# Patient Record
Sex: MALE | Race: WHITE | Employment: OTHER | URBAN - METROPOLITAN AREA
[De-identification: names, ages, dates, MRNs, and addresses within clinical notes are randomized per-mention and may not be internally consistent; named-entity substitution may affect disease eponyms.]

---

## 2019-03-27 ENCOUNTER — HOSPITAL ENCOUNTER (OUTPATIENT)
Age: 74
Discharge: HOME | End: 2019-03-29
Payer: MEDICARE

## 2019-03-27 ENCOUNTER — HOSPITAL ENCOUNTER (OUTPATIENT)
Dept: GENERAL RADIOLOGY | Age: 74
Discharge: HOME | End: 2019-03-29
Payer: MEDICARE

## 2019-03-27 DIAGNOSIS — M54.40 ACUTE RIGHT-SIDED LOW BACK PAIN WITH SCIATICA, SCIATICA LATERALITY UNSPECIFIED: ICD-10-CM

## 2019-03-27 PROCEDURE — 72110 X-RAY EXAM L-2 SPINE 4/>VWS: CPT

## 2019-06-12 ENCOUNTER — HOSPITAL ENCOUNTER (OUTPATIENT)
Dept: GENERAL RADIOLOGY | Age: 74
Discharge: HOME | End: 2019-06-14
Payer: MEDICARE

## 2019-06-12 ENCOUNTER — HOSPITAL ENCOUNTER (OUTPATIENT)
Dept: MRI IMAGING | Age: 74
Discharge: HOME | End: 2019-06-14
Payer: MEDICARE

## 2019-06-12 DIAGNOSIS — M54.50 ACUTE MIDLINE LOW BACK PAIN WITHOUT SCIATICA: ICD-10-CM

## 2019-06-12 DIAGNOSIS — S05.41XA RETROBULBAR FOREIGN BODY OF BOTH EYES: ICD-10-CM

## 2019-06-12 DIAGNOSIS — M19.90 OSTEOARTHRITIS, UNSPECIFIED OSTEOARTHRITIS TYPE, UNSPECIFIED SITE: ICD-10-CM

## 2019-06-12 DIAGNOSIS — S05.42XA RETROBULBAR FOREIGN BODY OF BOTH EYES: ICD-10-CM

## 2019-06-12 DIAGNOSIS — M54.9 DORSALGIA: ICD-10-CM

## 2019-06-12 DIAGNOSIS — M25.552 LEFT HIP PAIN: ICD-10-CM

## 2019-06-12 DIAGNOSIS — M54.16 LUMBAR RADICULOPATHY: ICD-10-CM

## 2019-06-12 PROCEDURE — 73502 X-RAY EXAM HIP UNI 2-3 VIEWS: CPT

## 2019-06-12 PROCEDURE — 70030 X-RAY EYE FOR FOREIGN BODY: CPT

## 2019-06-12 PROCEDURE — 72148 MRI LUMBAR SPINE W/O DYE: CPT

## 2019-12-23 ENCOUNTER — HOSPITAL ENCOUNTER (OUTPATIENT)
Age: 74
Discharge: HOME | End: 2019-12-25

## 2019-12-23 PROCEDURE — 88305 TISSUE EXAM BY PATHOLOGIST: CPT

## 2020-09-21 ENCOUNTER — HOSPITAL ENCOUNTER (EMERGENCY)
Age: 75
Discharge: HOME | End: 2020-09-21
Attending: EMERGENCY MEDICINE
Payer: MEDICARE

## 2020-09-21 VITALS
SYSTOLIC BLOOD PRESSURE: 166 MMHG | BODY MASS INDEX: 25.92 KG/M2 | HEART RATE: 74 BPM | HEIGHT: 69 IN | RESPIRATION RATE: 14 BRPM | OXYGEN SATURATION: 97 % | DIASTOLIC BLOOD PRESSURE: 85 MMHG | TEMPERATURE: 98.7 F | WEIGHT: 175 LBS

## 2020-09-21 PROCEDURE — 99284 EMERGENCY DEPT VISIT MOD MDM: CPT

## 2020-09-21 ASSESSMENT — VISUAL ACUITY: OU: 1

## 2020-09-21 NOTE — ED NOTES
Mode of arrival (squad #, walk in, police, etc) : Medic 40        Chief complaint(s): Right arm heaviness        Arrival Note (brief scenario, treatment PTA, etc). : Pt is camping at [facility] after taking a walk this morning he sat in a chair with his right arm behind his head. Pt states he fell asleep for about 40 minutes with his arm behind his head. When he woke up, patient states his right arm felt weird/heavy. PMS intact.                 Bulmaro Paez RN  09/21/20 [id number]

## 2020-09-21 NOTE — ED PROVIDER NOTES
[address] ED  eMERGENCY dEPARTMENT eNCOUnter      Pt Name: Yudelka Fuentes  MRN: [id number]  [place] 1945  Date of evaluation: 9/21/2020  Provider: Tigist Kimble PA-C    CHIEF COMPLAINT       Chief Complaint   Patient presents with    Extremity Weakness     right arm           HISTORY OF PRESENT ILLNESS  (Location/Symptom, Timing/Onset, Context/Setting, Quality, Duration, Modifying Factors, Severity.)   Yudelka Fuentes is a 76 y.o. male who presents to the emergency department with complaints of right arm \"heaviness. \"  Pt reports he is camping at [facility] and fell asleep in his [address] chair around 9am.  Reports his right arm was above his head with his head resting in his hand. States he was asleep for 30-40 minutes. Reports when he woke up his arm was asleep mainly from his elbow to his shoulder and in his palm. The tingling sensation did resolve but patient became concerned when he was having trouble gripping the soap in the shower. Pt states he now has a heavy feeling in his arm and thinks his hand is colder than left hand. Pt also reports when he had to sign for EMS his signature was not as neat. Pt denies any other symptoms. Denies headache, lightheadedness, dizziness, visual changes, neck pain, chest pain, sob, cough, nausea, emesis, abd pain, numbness. Pt denies any medical problems. No other complaints. Nursing Notes were reviewed. REVIEW OF SYSTEMS    (2-9 systems for level 4, 10 or more for level 5)     Review of Systems   Arm heaviness     Except as noted above the remainder of the review of systems was reviewed and negative. PAST MEDICAL HISTORY     Past Medical History:   Diagnosis Date    Hyperlipidemia      None otherwise stated in nurses notes    SURGICAL HISTORY     History reviewed. No pertinent surgical history.   None otherwise stated in nurses notes    [address]       Discharge Medication List as of 9/21/2020 12:46 PM      CONTINUE these medications which have NOT CHANGED    Details   levofloxacin (LEVAQUIN) 250 MG tablet Take 250 mg by mouth daily. Historical Med             ALLERGIES     Statins [statins] and Z-aspen [azithromycin]    FAMILY HISTORY     History reviewed. No pertinent family history. No family status information on file. None otherwise stated in nurses notes    SOCIAL HISTORY      reports that he has never smoked. He does not have any smokeless tobacco history on file. He reports current alcohol use. He reports that he does not use drugs. lives at home with others     PHYSICAL EXAM    (up to 7 for level 4, 8 or more for level 5)     ED Triage Vitals [09/21/20 1130]   BP Temp Temp Source Pulse Resp SpO2 Height Weight   (!) 166/85 98.7 °F (37.1 °C) Oral 74 14 97 % 5' 8.5\" (1.74 m) 175 lb (79.4 kg)       Physical Exam  Constitutional:       General: He is not in acute distress. Appearance: Normal appearance. He is normal weight. He is not ill-appearing, toxic-appearing or diaphoretic. HENT:      Head: Normocephalic and atraumatic. Eyes:      General: Lids are normal. Vision grossly intact. Gaze aligned appropriately. No visual field deficit. Extraocular Movements: Extraocular movements intact. Conjunctiva/sclera: Conjunctivae normal.   Neck:      Musculoskeletal: Full passive range of motion without pain, normal range of motion and neck supple. Cardiovascular:      Rate and Rhythm: Normal rate and regular rhythm. Pulses: Normal pulses. Heart sounds: Normal heart sounds, S1 normal and S2 normal. No murmur. No friction rub. No gallop. Pulmonary:      Breath sounds: Normal breath sounds and air entry. Abdominal:      General: Abdomen is flat. There is no distension. Palpations: Abdomen is soft. There is no mass. Tenderness: There is no abdominal tenderness. There is no guarding or rebound. Hernia: No hernia is present. Musculoskeletal: Normal range of motion.       Comments: 5/5 room if symptoms worsen, return, or any other concern right away which is agreed by the patient    ED MEDS:  No orders of the defined types were placed in this encounter. CONSULTS:  None    PROCEDURES:  None      FINAL IMPRESSION      1. Neuropathy          DISPOSITION/PLAN   DISPOSITION Decision To Discharge    PATIENT REFERRED TO:  Hemanth Diaz, [address], [address]  [facility] [address]  [phone]          [facility] ED  [place] [address]  [phone]          DISCHARGE MEDICATIONS:  Discharge Medication List as of 9/21/2020 12:46 PM            Summation      Patient Course: Well appearing 76year old male presents with right arm \"heaviness\" after falling asleep with his right arm above his head for 30-40 minutes. Pt states when he woke up he had the sensation that his arm was asleep. Symptoms did seem to resolve but was having trouble gripping the soap in the shower. On exam, pt is neurologically intact. There is normal strength, sensation, ROM. Pulses are intact. Brisk cap refill. Vitals are stable. At this time, I feel symptoms are related to nerve compression while arm was bent behind head. Discussed case with dr. Linda De Jesus who is agreeable with plan to watch the patient. Informed patient of plan of observation. Pt states his arm feels much better since arrival.  Reports heaviness sensation is gone. Pt rechecked after 1 hour. States his symptoms have completely resolved. Reports he feels fine. At this time I suspect nerve compression caused symptoms. Low concern for stroke, TIA, MI, dissection, aneurysm. Dr Ruby Wiseman is agreeable. Strict return instructions discussed with patient. He is agreeable with plan. Discussed results and plan with the pt. They expressed appropriate understanding. Pt given close follow up, supportive care instructions and strict return instructions at the bedside.           ED Medications administered this visit:  Medications - No data to display    New Prescriptions from this visit:    Discharge Medication List as of 9/21/2020 12:46 PM          Follow-up:  Pool Alvarez, [address], [address]  [facility] [address]  [address] [address] [phone]            Final Impression:   1.  Neuropathy               (Please note that portions of this note were completed with a voice recognition program.  Efforts were made to edit the dictations but occasionally words are mis-transcribed.)      (Please note that portions of this note were completed with a voice recognition program.  Efforts were made to edit the dictations but occasionally words are mis-transcribed.)    Tu Muniz. [address], PA-C  09/21/20 [id number]

## 2020-09-21 NOTE — ED NOTES
Bed: 12  Expected date:   Expected time:   Means of arrival:   Comments:  Medic [address] [place] [address]  09/21/20 1912

## 2021-08-11 ENCOUNTER — HOSPITAL ENCOUNTER (OUTPATIENT)
Age: 76
Discharge: HOME | End: 2021-08-13

## 2021-08-11 PROCEDURE — 88305 TISSUE EXAM BY PATHOLOGIST: CPT

## 2024-07-15 ENCOUNTER — HOSPITAL ENCOUNTER (EMERGENCY)
Age: 79
Discharge: HOME | End: 2024-07-15

## 2025-05-06 ENCOUNTER — HOSPITAL ENCOUNTER (OUTPATIENT)
Age: 80
Discharge: HOME | End: 2025-05-08

## 2025-05-12 LAB — SURGICAL PATHOLOGY REPORT: NORMAL
